# Patient Record
Sex: FEMALE | Race: BLACK OR AFRICAN AMERICAN
[De-identification: names, ages, dates, MRNs, and addresses within clinical notes are randomized per-mention and may not be internally consistent; named-entity substitution may affect disease eponyms.]

---

## 2017-01-04 ENCOUNTER — APPOINTMENT (OUTPATIENT)
Dept: INTERNAL MEDICINE | Facility: CLINIC | Age: 42
End: 2017-01-04

## 2017-06-02 ENCOUNTER — APPOINTMENT (OUTPATIENT)
Dept: ENDOCRINOLOGY | Facility: CLINIC | Age: 42
End: 2017-06-02

## 2018-02-13 ENCOUNTER — OUTPATIENT (OUTPATIENT)
Dept: OUTPATIENT SERVICES | Facility: HOSPITAL | Age: 43
LOS: 1 days | Discharge: HOME | End: 2018-02-13

## 2018-02-13 DIAGNOSIS — N64.4 MASTODYNIA: ICD-10-CM

## 2018-03-17 ENCOUNTER — OUTPATIENT (OUTPATIENT)
Dept: OUTPATIENT SERVICES | Facility: HOSPITAL | Age: 43
LOS: 1 days | Discharge: HOME | End: 2018-03-17

## 2018-03-17 DIAGNOSIS — R79.89 OTHER SPECIFIED ABNORMAL FINDINGS OF BLOOD CHEMISTRY: ICD-10-CM

## 2018-03-17 DIAGNOSIS — M06.9 RHEUMATOID ARTHRITIS, UNSPECIFIED: ICD-10-CM

## 2018-03-17 DIAGNOSIS — E55.9 VITAMIN D DEFICIENCY, UNSPECIFIED: ICD-10-CM

## 2018-03-17 DIAGNOSIS — A69.20 LYME DISEASE, UNSPECIFIED: ICD-10-CM

## 2018-03-17 DIAGNOSIS — D64.9 ANEMIA, UNSPECIFIED: ICD-10-CM

## 2018-09-21 ENCOUNTER — APPOINTMENT (OUTPATIENT)
Dept: ENDOCRINOLOGY | Facility: CLINIC | Age: 43
End: 2018-09-21

## 2019-09-18 ENCOUNTER — EMERGENCY (EMERGENCY)
Facility: HOSPITAL | Age: 44
LOS: 0 days | Discharge: LEFT BEFORE DISCHARGE | End: 2019-09-18
Attending: EMERGENCY MEDICINE | Admitting: EMERGENCY MEDICINE
Payer: MEDICAID

## 2019-09-18 VITALS
OXYGEN SATURATION: 100 % | RESPIRATION RATE: 20 BRPM | HEART RATE: 80 BPM | SYSTOLIC BLOOD PRESSURE: 129 MMHG | TEMPERATURE: 98 F | WEIGHT: 119.93 LBS | DIASTOLIC BLOOD PRESSURE: 61 MMHG

## 2019-09-18 VITALS
TEMPERATURE: 98 F | RESPIRATION RATE: 18 BRPM | OXYGEN SATURATION: 99 % | HEART RATE: 72 BPM | DIASTOLIC BLOOD PRESSURE: 62 MMHG | SYSTOLIC BLOOD PRESSURE: 120 MMHG

## 2019-09-18 DIAGNOSIS — J32.9 CHRONIC SINUSITIS, UNSPECIFIED: ICD-10-CM

## 2019-09-18 DIAGNOSIS — R07.9 CHEST PAIN, UNSPECIFIED: ICD-10-CM

## 2019-09-18 LAB
ALBUMIN SERPL ELPH-MCNC: 4.4 G/DL — SIGNIFICANT CHANGE UP (ref 3.5–5.2)
ALP SERPL-CCNC: 52 U/L — SIGNIFICANT CHANGE UP (ref 30–115)
ALT FLD-CCNC: 11 U/L — SIGNIFICANT CHANGE UP (ref 0–41)
ANION GAP SERPL CALC-SCNC: 6 MMOL/L — LOW (ref 7–14)
AST SERPL-CCNC: 38 U/L — SIGNIFICANT CHANGE UP (ref 0–41)
BASOPHILS # BLD AUTO: 0.03 K/UL — SIGNIFICANT CHANGE UP (ref 0–0.2)
BASOPHILS NFR BLD AUTO: 0.5 % — SIGNIFICANT CHANGE UP (ref 0–1)
BILIRUB SERPL-MCNC: 0.2 MG/DL — SIGNIFICANT CHANGE UP (ref 0.2–1.2)
BUN SERPL-MCNC: 11 MG/DL — SIGNIFICANT CHANGE UP (ref 10–20)
CALCIUM SERPL-MCNC: 8.8 MG/DL — SIGNIFICANT CHANGE UP (ref 8.5–10.1)
CHLORIDE SERPL-SCNC: 105 MMOL/L — SIGNIFICANT CHANGE UP (ref 98–110)
CO2 SERPL-SCNC: 23 MMOL/L — SIGNIFICANT CHANGE UP (ref 17–32)
CREAT SERPL-MCNC: 0.8 MG/DL — SIGNIFICANT CHANGE UP (ref 0.7–1.5)
EOSINOPHIL # BLD AUTO: 0.11 K/UL — SIGNIFICANT CHANGE UP (ref 0–0.7)
EOSINOPHIL NFR BLD AUTO: 1.9 % — SIGNIFICANT CHANGE UP (ref 0–8)
GAS PNL BLDV: SIGNIFICANT CHANGE UP
GLUCOSE SERPL-MCNC: 114 MG/DL — HIGH (ref 70–99)
HCT VFR BLD CALC: 37.2 % — SIGNIFICANT CHANGE UP (ref 37–47)
HGB BLD-MCNC: 12.4 G/DL — SIGNIFICANT CHANGE UP (ref 12–16)
IMM GRANULOCYTES NFR BLD AUTO: 0 % — LOW (ref 0.1–0.3)
LYMPHOCYTES # BLD AUTO: 2.35 K/UL — SIGNIFICANT CHANGE UP (ref 1.2–3.4)
LYMPHOCYTES # BLD AUTO: 40.7 % — SIGNIFICANT CHANGE UP (ref 20.5–51.1)
MCHC RBC-ENTMCNC: 33.3 G/DL — SIGNIFICANT CHANGE UP (ref 32–37)
MCHC RBC-ENTMCNC: 33.6 PG — HIGH (ref 27–31)
MCV RBC AUTO: 100.8 FL — HIGH (ref 81–99)
MONOCYTES # BLD AUTO: 0.47 K/UL — SIGNIFICANT CHANGE UP (ref 0.1–0.6)
MONOCYTES NFR BLD AUTO: 8.1 % — SIGNIFICANT CHANGE UP (ref 1.7–9.3)
NEUTROPHILS # BLD AUTO: 2.81 K/UL — SIGNIFICANT CHANGE UP (ref 1.4–6.5)
NEUTROPHILS NFR BLD AUTO: 48.8 % — SIGNIFICANT CHANGE UP (ref 42.2–75.2)
NRBC # BLD: 0 /100 WBCS — SIGNIFICANT CHANGE UP (ref 0–0)
PLATELET # BLD AUTO: 227 K/UL — SIGNIFICANT CHANGE UP (ref 130–400)
POTASSIUM SERPL-MCNC: SIGNIFICANT CHANGE UP MMOL/L (ref 3.5–5)
POTASSIUM SERPL-SCNC: SIGNIFICANT CHANGE UP MMOL/L (ref 3.5–5)
PROT SERPL-MCNC: 8.1 G/DL — HIGH (ref 6–8)
RBC # BLD: 3.69 M/UL — LOW (ref 4.2–5.4)
RBC # FLD: 14.2 % — SIGNIFICANT CHANGE UP (ref 11.5–14.5)
SODIUM SERPL-SCNC: 134 MMOL/L — LOW (ref 135–146)
WBC # BLD: 5.77 K/UL — SIGNIFICANT CHANGE UP (ref 4.8–10.8)
WBC # FLD AUTO: 5.77 K/UL — SIGNIFICANT CHANGE UP (ref 4.8–10.8)

## 2019-09-18 PROCEDURE — 71046 X-RAY EXAM CHEST 2 VIEWS: CPT | Mod: 26

## 2019-09-18 PROCEDURE — 93010 ELECTROCARDIOGRAM REPORT: CPT

## 2019-09-18 PROCEDURE — 99284 EMERGENCY DEPT VISIT MOD MDM: CPT

## 2019-09-18 RX ORDER — KETOROLAC TROMETHAMINE 30 MG/ML
15 SYRINGE (ML) INJECTION ONCE
Refills: 0 | Status: DISCONTINUED | OUTPATIENT
Start: 2019-09-18 | End: 2019-09-18

## 2019-09-18 RX ADMIN — Medication 15 MILLIGRAM(S): at 10:06

## 2019-09-18 NOTE — ED PROVIDER NOTE - CLINICAL SUMMARY MEDICAL DECISION MAKING FREE TEXT BOX
Labs reassuring aside from unresulted potassium.  Likely viral sinusitis.  Repeat K normal.  D/C home.  Supportive care.

## 2019-09-18 NOTE — ED PROVIDER NOTE - PROGRESS NOTE DETAILS
pt vbg needs to be done due to K resulting as "TNP". pt does not want to wait for results and requesting to go home to rest in her bed. I had extensive discussion of risks and benefits of pursuing further medical evaluation and/or care with patient and any available family/friends; patient still electing to leave against medical advice. Patient is awake, alert, oriented and demonstrates full capacity and insight into illness. Patient aware and encouraged to return immediately to ED or nearest ED if patient decides to change mind regarding care or if patient experiences any new, worsening, or concerning symptoms.

## 2019-09-18 NOTE — ED PROVIDER NOTE - ATTENDING CONTRIBUTION TO CARE
45 y/o female with hx of sinusitis presents to ED with congestion, myalgia and cough x 3 days, (+) ear fullness.  No fever, HA, neck pain, abdominal pain, vaginal discharge, dysuria/frequency.  PE:  agree with above  A/P:  Viral Syndrome.  Check labs and reassess.

## 2019-09-18 NOTE — ED PROVIDER NOTE - PATIENT PORTAL LINK FT
You can access the FollowMyHealth Patient Portal offered by [facility] by registering at the following website: [email or web address]. By joining [employer]’s FollowMyHealth portal, you will also be able to view your health information using other applications (apps) compatible with our system.

## 2019-09-18 NOTE — ED ADULT NURSE NOTE - OBJECTIVE STATEMENT
Pt brought in by friend. Pt c/o of generalized pain throughout body, cough, no sputum. Pt states no trauma to body. Took tylenol for pain, no relief.

## 2019-09-18 NOTE — ED PROVIDER NOTE - NSFOLLOWUPINSTRUCTIONS_ED_ALL_ED_FT
Please follow up with your primary care physician within 24-72 hours and return immediately if symptoms worsen.    Sinusitis, Adult  Sinusitis is soreness and swelling (inflammation) of your sinuses. Sinuses are hollow spaces in the bones around your face. They are located:    Around your eyes.  In the middle of your forehead.  Behind your nose.  In your cheekbones.    Your sinuses and nasal passages are lined with a stringy fluid (mucus). Mucus normally drains out of your sinuses. Swelling can trap mucus in your sinuses. This lets germs like bacteria or viruses grow, and that leads to infection. Most of the time, sinusitis is caused by a virus.    ImageIf bacteria is causing your infection, your doctor may have you wait and see if you get better without antibiotic medicine. You may be prescribed antibiotics if you have:    A very bad infection.  A weak disease-fighting (immune) system.    Follow these instructions at home:  Medicines     Take, use, or apply over-the-counter and prescription medicines only as told by your doctor. These may include nasal sprays.  If you were prescribed an antibiotic, take it as told by your doctor. Do not stop taking the antibiotic even if you start to feel better.  Hydrate and Humidify     Drink enough water to keep your pee (urine) pale yellow.  Use a cool mist humidifier to keep the humidity level in your home above 50%.  Breathe in steam for 10–15 minutes, 3–4 times a day or as told by your doctor. You can do this in the bathroom while a hot shower is running.  Try not to spend time in cool or dry air.  Rest     Rest as much as possible.  Sleep with your head raised (elevated).  Make sure to get enough sleep each night.  General instructions     Put a warm, moist washcloth on your face 3–4 times a day, or as often as told by your doctor. This will help with discomfort.  Wash your hands often with soap and water. If there is no soap and water, use hand .  Do not smoke. Avoid being around people who are smoking (secondhand smoke).  Keep all follow-up visits as told by your doctor. This is important.  Contact a doctor if:  You have a fever.  Your symptoms get worse.  Your symptoms do not get better within 10 days.  Get help right away if:  You have a very bad headache.  You cannot stop throwing up (vomiting).  You have pain or swelling around your face or eyes.  You have trouble seeing.  You feel confused.  Your neck is stiff.  You have trouble breathing.  This information is not intended to replace advice given to you by your health care provider. Make sure you discuss any questions you have with your health care provider.

## 2019-09-18 NOTE — ED PROVIDER NOTE - PHYSICAL EXAMINATION
Gen: NAD  Head: NCAT  HEENT: PERRL, oral mucosa moist, normal conjunctiva, oropharynx clear without exudate or erythema  Lung: CTAB, no respiratory distress, no wheezing, rales, rhonchi  CV: normal s1/s2, rrr, Normal perfusion, pulses 2+ throughout  Abd: soft, NTND, no CVA tenderness  Genitourinary: no pelvic tenderness  MSK: No edema, no visible deformities, full range of motion in all 4 extremities  Neuro: AOx3  Skin: No rash   Psych: normal affect

## 2019-09-18 NOTE — ED PROVIDER NOTE - NS ED ROS FT
Constitutional: (-) fever  Eyes/ENT: (-) blurry vision, (-) epistaxis, (-) visual changes   Cardiovascular: (-) chest pain, (-) syncope  Respiratory: (+) cough, (-) shortness of breath  Gastrointestinal: (-) vomiting, (-) diarrhea  Genitourinary: (-) dysuria, (-) hesitancy, (-) frequency   Musculoskeletal: (-) neck pain, (-) back pain, (-) joint pain, body aches   Integumentary: (-) rash, (-) edema  Neurological: (-) headache, (-) altered mental status  Allergic/Immunologic: (-) pruritus

## 2019-09-18 NOTE — ED PROVIDER NOTE - OBJECTIVE STATEMENT
45 yo female with no known pmh presents with body aches and congestion. pt states this has been presents for 3 days with a non-productive cough. she had also experience chill and ear fullness. she follows with ENT and had eustachian tubes placed one year prior but had not seen ENT for about a year. she denies any other symptoms including fevers, headache, recent illness/travel, abdominal pain, chest pain, or SOB.

## 2019-09-18 NOTE — ED ADULT TRIAGE NOTE - CHIEF COMPLAINT QUOTE
patient reports cough congestion body aches x few days with midsternal chest pain radiating to left chest . lung sounds clear but diminished to left base. denies fever no sick contacts

## 2019-09-18 NOTE — ED PROVIDER NOTE - NSFOLLOWUPCLINICS_GEN_ALL_ED_FT
[facility]  ENT  [address], [address]nd [place] [address]  Phone: [phone]  Fax:   Follow Up Time: 1-3 Days

## 2019-12-04 ENCOUNTER — EMERGENCY (EMERGENCY)
Facility: HOSPITAL | Age: 44
LOS: 0 days | Discharge: HOME | End: 2019-12-04
Admitting: EMERGENCY MEDICINE
Payer: MEDICAID

## 2019-12-04 VITALS
TEMPERATURE: 99 F | DIASTOLIC BLOOD PRESSURE: 73 MMHG | RESPIRATION RATE: 20 BRPM | OXYGEN SATURATION: 100 % | SYSTOLIC BLOOD PRESSURE: 133 MMHG | HEART RATE: 98 BPM

## 2019-12-04 DIAGNOSIS — M53.3 SACROCOCCYGEAL DISORDERS, NOT ELSEWHERE CLASSIFIED: ICD-10-CM
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
## 2019-12-04 DIAGNOSIS — F17.210 NICOTINE DEPENDENCE, CIGARETTES, UNCOMPLICATED: ICD-10-CM
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
## 2019-12-04 DIAGNOSIS — J02.9 ACUTE PHARYNGITIS, UNSPECIFIED: ICD-10-CM

## 2019-12-04 DIAGNOSIS — M79.651 PAIN IN RIGHT THIGH: ICD-10-CM

## 2019-12-04 PROCEDURE — 99283 EMERGENCY DEPT VISIT LOW MDM: CPT

## 2019-12-04 RX ORDER — KETOROLAC TROMETHAMINE 30 MG/ML
30 SYRINGE (ML) INJECTION ONCE
Refills: 0 | Status: DISCONTINUED | OUTPATIENT
Start: 2019-12-04 | End: 2019-12-04

## 2019-12-04 RX ORDER — ACETAMINOPHEN 500 MG
975 TABLET ORAL ONCE
Refills: 0 | Status: COMPLETED | OUTPATIENT
Start: 2019-12-04 | End: 2019-12-04

## 2019-12-04 RX ORDER — AZITHROMYCIN 500 MG/1
1 TABLET, FILM COATED ORAL
Qty: 5 | Refills: 0
Start: 2019-12-04 | End: 2019-12-08

## 2019-12-04 RX ADMIN — Medication 30 MILLIGRAM(S): at 10:24

## 2019-12-04 RX ADMIN — Medication 975 MILLIGRAM(S): at 10:23

## 2019-12-04 NOTE — ED PROVIDER NOTE - PATIENT PORTAL LINK FT
You can access the FollowMyHealth Patient Portal offered by [facility] by registering at the following website: [email or web address]. By joining [employer]’s FollowMyHealth portal, you will also be able to view your health information using other applications (apps) compatible with our system.

## 2019-12-04 NOTE — ED PROVIDER NOTE - CLINICAL SUMMARY MEDICAL DECISION MAKING FREE TEXT BOX
43yo female, active cigarette smoker with flu like illness. Discussed risks and benefits of tamiflu. Patient declines. Will prescribe zpak for productive cough in a smoker. Provided NSAIDs and tylenol. f/u med clinic

## 2019-12-04 NOTE — ED PROVIDER NOTE - CARE PLAN
Principal Discharge DX:	Flu-like symptoms  Secondary Diagnosis:	Leg pain  Secondary Diagnosis:	Cough  Secondary Diagnosis:	Fall

## 2019-12-04 NOTE — ED PROVIDER NOTE - PHYSICAL EXAMINATION
CONST: Uncomfortable, fatigued appearing, lying in fetal position on stretcher with 2 blankets.  EYES: PERRL, EOMI, Sclera and conjunctiva clear.   ENT: No nasal discharge. TM's clear B/L without drainage. Oropharynx normal appearing, no erythema or exudates.   NECK: Non-tender, no meningeal signs  CARD: Normal S1 S2; Normal rate and rhythm  RESP: Equal BS B/L, No wheezes, rhonchi or rales. No distress  GI: Soft, non-tender, non-distended.  MS: Normal ROM in all extremities. No midline spinal tenderness. Mild tenderness R gluteus and lateral thigh. No bony tenderness. Neg SLR  SKIN: Warm, dry, no acute rashes. Good turgor  NEURO: A&Ox3, No focal deficits. Strength 5/5 with no sensory deficits.

## 2019-12-04 NOTE — ED PROVIDER NOTE - NS ED ROS FT
CONST: No fever, chills or bodyaches  EYES: No pain, redness, drainage or visual changes.  ENT: (+) sore throat  CARD: No chest pain, palpitations  RESP: cough productive of green sputum  GI: No abdominal pain  MS: see HPI  SKIN: No rashes  NEURO: No paresthesias

## 2019-12-04 NOTE — ED PROVIDER NOTE - OBJECTIVE STATEMENT
45yo female with no significant PMHx presents c/o fever, chills, bodyaches, cough, sore throat since last night, worsening this AM and also reports a fall yesterday while at work, slipping on ice, and falling onto R buttock and leg. Patient states upon awakening, she had a subjective fever, diffuse bodyaches, noting "skin and eyes even hurt," and cough productive of green sputum. No relieving or aggravating factors. (+) sick contacts at work and did not receive flu shot.

## 2019-12-04 NOTE — ED ADULT TRIAGE NOTE - CHIEF COMPLAINT QUOTE
Pt fell outside of work yesterday and hurt entire right side of body. Pt also c/o generalized body aches and sore throat, deneis head injury

## 2019-12-04 NOTE — ED PROVIDER NOTE - NSFOLLOWUPCLINICS_GEN_ALL_ED_FT
[facility]  Medicine  [address], [place]   Phone: [phone]  Fax:   Follow Up Time:

## 2019-12-04 NOTE — ED ADULT NURSE NOTE - CHIEF COMPLAINT QUOTE
Pt fell outside of work yesterday and hurt entire right side of body. Pt also c/o generalized body aches and sore throat, denies head injury

## 2019-12-04 NOTE — ED PROVIDER NOTE - NSFOLLOWUPINSTRUCTIONS_ED_ALL_ED_FT
Viral Illness, Adult  Viruses are tiny germs that can get into a person's body and cause illness. There are many different types of viruses, and they cause many types of illness. Viral illnesses can range from mild to severe. They can affect various parts of the body.    What are the causes?  Many types of viruses can cause illness. Viruses invade cells in your body, multiply, and cause the infected cells to malfunction or die. When the cell dies, it releases more of the virus. When this happens, you develop symptoms of the illness, and the virus continues to spread to other cells. If the virus takes over the function of the cell, it can cause the cell to divide and grow out of control, as is the case when a virus causes cancer.  Different viruses get into the body in different ways. You can get a virus by:  Swallowing food or water that is contaminated with the virus.Breathing in droplets that have been coughed or sneezed into the air by an infected person.Touching a surface that has been contaminated with the virus and then touching your eyes, nose, or mouth.Being bitten by an insect or animal that carries the virus.Having sexual contact with a person who is infected with the virus.Being exposed to blood or fluids that contain the virus, either through an open cut or during a transfusion.If a virus enters your body, your body's defense system (immune system) will try to fight the virus. You may be at higher risk for a viral illness if your immune system is weak.  What are the signs or symptoms?  Symptoms vary depending on the type of virus and the location of the cells that it invades. Common symptoms of the main types of viral illnesses include:  Cold and flu viruses     Fever.Headache.Sore throat.Muscle aches.Nasal congestion.Cough.Digestive system (gastrointestinal) viruses     Fever.Abdominal pain.Nausea.Diarrhea.Liver viruses (hepatitis)     Loss of appetite.Tiredness.Yellowing of the skin (jaundice).Brain and spinal cord viruses     Fever.Headache.Stiff neck.Nausea and vomiting.Confusion or sleepiness.Skin viruses     Warts.Itching.Rash.Sexually transmitted viruses     Discharge.Swelling.Redness.Rash.How is this treated?  Viruses can be difficult to treat because they live within cells. Antibiotic medicines do not treat viruses because these drugs do not get inside cells. Treatment for a viral illness may include:  Resting and drinking plenty of fluids.Medicines to relieve symptoms. These can include over-the-counter medicine for pain and fever, medicines for cough or congestion, and medicines to relieve diarrhea.Antiviral medicines. These drugs are available only for certain types of viruses. They may help reduce flu symptoms if taken early. There are also many antiviral medicines for hepatitis and HIV/AIDS.Some viral illnesses can be prevented with vaccinations. A common example is the flu shot.  Follow these instructions at home:  Medicines        Take over-the-counter and prescription medicines only as told by your health care provider.If you were prescribed an antiviral medicine, take it as told by your health care provider. Do not stop taking the medicine even if you start to feel better.Be aware of when antibiotics are needed and when they are not needed. Antibiotics do not treat viruses. If your health care provider thinks that you may have a bacterial infection as well as a viral infection, you may get an antibiotic.  Do not ask for an antibiotic prescription if you have been diagnosed with a viral illness. That will not make your illness go away faster.Frequently taking antibiotics when they are not needed can lead to antibiotic resistance. When this develops, the medicine no longer works against the bacteria that it normally fights.General instructions     Drink enough fluids to keep your urine clear or pale yellow.Rest as much as possible.Return to your normal activities as told by your health care provider. Ask your health care provider what activities are safe for you.Keep all follow-up visits as told by your health care provider. This is important.How is this prevented?  Take these actions to reduce your risk of viral infection:  Eat a healthy diet and get enough rest.Wash your hands often with soap and water. This is especially important when you are in public places. If soap and water are not available, use hand .Avoid close contact with friends and family who have a viral illness.If you travel to areas where viral gastrointestinal infection is common, avoid drinking water or eating raw food.Keep your immunizations up to date. Get a flu shot every year as told by your health care provider.Do not share toothbrushes, nail clippers, razors, or needles with other people.Always practice safe sex.Contact a health care provider if:  You have symptoms of a viral illness that do not go away.Your symptoms come back after going away.Your symptoms get worse.Get help right away if:  You have trouble breathing.You have a severe headache or a stiff neck.You have severe vomiting or abdominal pain.This information is not intended to replace advice given to you by your health care provider. Make sure you discuss any questions you have with your health care provider.

## 2020-08-31 ENCOUNTER — OUTPATIENT (OUTPATIENT)
Dept: OUTPATIENT SERVICES | Facility: HOSPITAL | Age: 45
LOS: 1 days | Discharge: HOME | End: 2020-08-31

## 2020-08-31 PROBLEM — Z78.9 OTHER SPECIFIED HEALTH STATUS: Chronic | Status: ACTIVE | Noted: 2019-12-04

## 2021-08-07 ENCOUNTER — EMERGENCY (EMERGENCY)
Facility: HOSPITAL | Age: 46
LOS: 0 days | Discharge: LEFT BEFORE DISCHARGE | End: 2021-08-08
Attending: EMERGENCY MEDICINE | Admitting: EMERGENCY MEDICINE
Payer: MEDICAID

## 2021-08-07 VITALS
WEIGHT: 149.91 LBS | RESPIRATION RATE: 18 BRPM | HEART RATE: 94 BPM | OXYGEN SATURATION: 100 % | TEMPERATURE: 97 F | SYSTOLIC BLOOD PRESSURE: 155 MMHG | DIASTOLIC BLOOD PRESSURE: 63 MMHG

## 2021-08-07 DIAGNOSIS — F17.200 NICOTINE DEPENDENCE, UNSPECIFIED, UNCOMPLICATED: ICD-10-CM

## 2021-08-07 DIAGNOSIS — F41.9 ANXIETY DISORDER, UNSPECIFIED: ICD-10-CM

## 2021-08-07 DIAGNOSIS — R10.84 GENERALIZED ABDOMINAL PAIN: ICD-10-CM

## 2021-08-07 DIAGNOSIS — R11.2 NAUSEA WITH VOMITING, UNSPECIFIED: ICD-10-CM

## 2021-08-07 DIAGNOSIS — F32.9 MAJOR DEPRESSIVE DISORDER, SINGLE EPISODE, UNSPECIFIED: ICD-10-CM

## 2021-08-07 DIAGNOSIS — Z87.19 PERSONAL HISTORY OF OTHER DISEASES OF THE DIGESTIVE SYSTEM: ICD-10-CM

## 2021-08-07 LAB
ALBUMIN SERPL ELPH-MCNC: 4.3 G/DL — SIGNIFICANT CHANGE UP (ref 3.5–5.2)
ALP SERPL-CCNC: 89 U/L — SIGNIFICANT CHANGE UP (ref 30–115)
ALT FLD-CCNC: 45 U/L — HIGH (ref 0–41)
ANION GAP SERPL CALC-SCNC: 11 MMOL/L — SIGNIFICANT CHANGE UP (ref 7–14)
AST SERPL-CCNC: 164 U/L — HIGH (ref 0–41)
BASOPHILS # BLD AUTO: 0.03 K/UL — SIGNIFICANT CHANGE UP (ref 0–0.2)
BASOPHILS NFR BLD AUTO: 0.4 % — SIGNIFICANT CHANGE UP (ref 0–1)
BILIRUB DIRECT SERPL-MCNC: <0.2 MG/DL — SIGNIFICANT CHANGE UP (ref 0–0.2)
BILIRUB INDIRECT FLD-MCNC: >0.1 MG/DL — LOW (ref 0.2–1.2)
BILIRUB SERPL-MCNC: 0.3 MG/DL — SIGNIFICANT CHANGE UP (ref 0.2–1.2)
BUN SERPL-MCNC: 14 MG/DL — SIGNIFICANT CHANGE UP (ref 10–20)
CALCIUM SERPL-MCNC: 9 MG/DL — SIGNIFICANT CHANGE UP (ref 8.5–10.1)
CHLORIDE SERPL-SCNC: 102 MMOL/L — SIGNIFICANT CHANGE UP (ref 98–110)
CO2 SERPL-SCNC: 23 MMOL/L — SIGNIFICANT CHANGE UP (ref 17–32)
CREAT SERPL-MCNC: 1 MG/DL — SIGNIFICANT CHANGE UP (ref 0.7–1.5)
EOSINOPHIL # BLD AUTO: 0.11 K/UL — SIGNIFICANT CHANGE UP (ref 0–0.7)
EOSINOPHIL NFR BLD AUTO: 1.4 % — SIGNIFICANT CHANGE UP (ref 0–8)
GLUCOSE SERPL-MCNC: 102 MG/DL — HIGH (ref 70–99)
HCT VFR BLD CALC: 33 % — LOW (ref 37–47)
HGB BLD-MCNC: 11.1 G/DL — LOW (ref 12–16)
IMM GRANULOCYTES NFR BLD AUTO: 0.2 % — SIGNIFICANT CHANGE UP (ref 0.1–0.3)
LACTATE SERPL-SCNC: 1.3 MMOL/L — SIGNIFICANT CHANGE UP (ref 0.7–2)
LIDOCAIN IGE QN: 26 U/L — SIGNIFICANT CHANGE UP (ref 7–60)
LYMPHOCYTES # BLD AUTO: 3.49 K/UL — HIGH (ref 1.2–3.4)
LYMPHOCYTES # BLD AUTO: 43.6 % — SIGNIFICANT CHANGE UP (ref 20.5–51.1)
MCHC RBC-ENTMCNC: 33.3 PG — HIGH (ref 27–31)
MCHC RBC-ENTMCNC: 33.6 G/DL — SIGNIFICANT CHANGE UP (ref 32–37)
MCV RBC AUTO: 99.1 FL — HIGH (ref 81–99)
MONOCYTES # BLD AUTO: 0.51 K/UL — SIGNIFICANT CHANGE UP (ref 0.1–0.6)
MONOCYTES NFR BLD AUTO: 6.4 % — SIGNIFICANT CHANGE UP (ref 1.7–9.3)
NEUTROPHILS # BLD AUTO: 3.85 K/UL — SIGNIFICANT CHANGE UP (ref 1.4–6.5)
NEUTROPHILS NFR BLD AUTO: 48 % — SIGNIFICANT CHANGE UP (ref 42.2–75.2)
NRBC # BLD: 0 /100 WBCS — SIGNIFICANT CHANGE UP (ref 0–0)
PLATELET # BLD AUTO: 206 K/UL — SIGNIFICANT CHANGE UP (ref 130–400)
POTASSIUM SERPL-MCNC: 5.9 MMOL/L — HIGH (ref 3.5–5)
POTASSIUM SERPL-SCNC: 5.9 MMOL/L — HIGH (ref 3.5–5)
PROT SERPL-MCNC: 7.9 G/DL — SIGNIFICANT CHANGE UP (ref 6–8)
RBC # BLD: 3.33 M/UL — LOW (ref 4.2–5.4)
RBC # FLD: 14 % — SIGNIFICANT CHANGE UP (ref 11.5–14.5)
SODIUM SERPL-SCNC: 136 MMOL/L — SIGNIFICANT CHANGE UP (ref 135–146)
WBC # BLD: 8.01 K/UL — SIGNIFICANT CHANGE UP (ref 4.8–10.8)
WBC # FLD AUTO: 8.01 K/UL — SIGNIFICANT CHANGE UP (ref 4.8–10.8)

## 2021-08-07 PROCEDURE — 99285 EMERGENCY DEPT VISIT HI MDM: CPT

## 2021-08-07 PROCEDURE — 93010 ELECTROCARDIOGRAM REPORT: CPT

## 2021-08-07 RX ORDER — ONDANSETRON 8 MG/1
4 TABLET, FILM COATED ORAL ONCE
Refills: 0 | Status: COMPLETED | OUTPATIENT
Start: 2021-08-07 | End: 2021-08-07

## 2021-08-07 RX ORDER — SODIUM CHLORIDE 9 MG/ML
1000 INJECTION, SOLUTION INTRAVENOUS ONCE
Refills: 0 | Status: COMPLETED | OUTPATIENT
Start: 2021-08-07 | End: 2021-08-07

## 2021-08-07 RX ORDER — MORPHINE SULFATE 50 MG/1
4 CAPSULE, EXTENDED RELEASE ORAL ONCE
Refills: 0 | Status: DISCONTINUED | OUTPATIENT
Start: 2021-08-07 | End: 2021-08-07

## 2021-08-07 RX ADMIN — SODIUM CHLORIDE 1000 MILLILITER(S): 9 INJECTION, SOLUTION INTRAVENOUS at 22:44

## 2021-08-07 RX ADMIN — MORPHINE SULFATE 4 MILLIGRAM(S): 50 CAPSULE, EXTENDED RELEASE ORAL at 22:44

## 2021-08-07 RX ADMIN — ONDANSETRON 4 MILLIGRAM(S): 8 TABLET, FILM COATED ORAL at 22:44

## 2021-08-08 LAB — HCG SERPL QL: NEGATIVE — SIGNIFICANT CHANGE UP

## 2021-08-08 NOTE — ED PROVIDER NOTE - CLINICAL SUMMARY MEDICAL DECISION MAKING FREE TEXT BOX
Patient remained stable in ED, improved well. Patient refused any further medical care in ED, refused admission, refused any further diagnostic studies or treatments. Discussed with patient in detail about clinical condition, and the need for further medical evaluation/treatments, patient verbalized understanding and still refused. Discussed with patient about the risks of leaving AMA, Patient verbalized understanding the information provided and still wanted to leave AMA. Patient is awake, alert, o x 3, coherent, and is capacitated to make decisions. Discussed with patient in detail about clinical condition, results of the diagnostic studies, was told to come back to ED if decide to continue with medical evaluation/treatments, and was discussed about  the need for close out patient follow up. Detail aftercare instructions and return precautions are given.

## 2021-08-08 NOTE — ED PROVIDER NOTE - OBJECTIVE STATEMENT
44 y/o F PMHx anxiety and depression s/p маринаey and appy in past presents to ED with diffuse abdominal pain acutely starting tonight after eating [employer] for dinner. Pt reports nausea and nbnb vomiting. No fevers. No chest pain. No diarrhea or bloody stools. Adnominal pain is diffuse, moderate and non radiating. Denies vaginal bleeding, discharge or dysuria.

## 2021-08-08 NOTE — ED PROVIDER NOTE - PHYSICAL EXAMINATION
CONSTITUTIONAL: Well-developed; well-nourished; in no acute distress.   SKIN: warm, dry  HEAD: Normocephalic; atraumatic.  EYES: no conjunctival injection. EOMI.   ENT: No nasal discharge; airway clear.  NECK: Supple; non tender.  CARD: S1, S2 normal;  Regular rate and rhythm.   RESP: No wheezes, rales or rhonchi.  ABD: soft nondistended, +diffuse TTP, no CVA TTP   EXT: Normal ROM.  No clubbing, cyanosis or edema.   LYMPH: No acute cervical adenopathy.  NEURO: Alert, oriented, grossly unremarkable.  PSYCH: Cooperative, appropriate.

## 2021-08-08 NOTE — ED PROVIDER NOTE - PATIENT PORTAL LINK FT
You can access the FollowMyHealth Patient Portal offered by [facility] by registering at the following website: [email or web address]. By joining [employer]’s FollowMyHealth portal, you will also be able to view your health information using other applications (apps) compatible with our system.

## 2021-08-08 NOTE — ED PROVIDER NOTE - CARE PROVIDER_API CALL
Dana Russo (MD)  Gastroenterology  [address] [place] [address]  Phone: [phone]  Fax: [phone]  Follow Up Time: 1-3 Days

## 2021-08-08 NOTE — ED PROVIDER NOTE - NS ED ROS FT
Review of Systems:  CONSTITUTIONAL: No fever, No diaphoresis   SKIN: No rash  HEMATOLOGIC: No abnormal bleeding or bruising  EYES: No eye pain, No blurred vision  ENT:  No sore throat, No neck pain, No rhinorrhea   RESPIRATORY: No shortness of breath, No cough  CARDIAC: No chest pain, No palpitations  GI: +abdominal pain, +nausea, +vomiting, No diarrhea, No constipation, No bright red blood per rectum or melena. No flank pain  : No dysuria, frequency, hematuria.   MUSCULOSKELETAL: No joint paint, No swelling, No back pain  NEUROLOGIC: No numbness, No focal weakness, No headache, No dizziness  All other systems negative, unless specified in HPI

## 2021-08-08 NOTE — ED PROVIDER NOTE - ATTENDING CONTRIBUTION TO CARE
Patient is c/o abd pain, generalized abd pain, with n/v, denies cp/sob/syncope/trauma.   Vitals reviewed.   Lungs: CTA,   abd: +BS, +generalized tenderness, ND, soft,   A/P: Abd pain,   labs, imaging,   reevaluation.

## 2021-12-15 ENCOUNTER — EMERGENCY (EMERGENCY)
Facility: HOSPITAL | Age: 46
LOS: 0 days | Discharge: HOME | End: 2021-12-15
Attending: EMERGENCY MEDICINE | Admitting: EMERGENCY MEDICINE
Payer: MEDICAID

## 2021-12-15 VITALS
WEIGHT: 119.93 LBS | SYSTOLIC BLOOD PRESSURE: 135 MMHG | HEART RATE: 101 BPM | DIASTOLIC BLOOD PRESSURE: 80 MMHG | RESPIRATION RATE: 17 BRPM | OXYGEN SATURATION: 100 % | TEMPERATURE: 98 F

## 2021-12-15 DIAGNOSIS — F17.200 NICOTINE DEPENDENCE, UNSPECIFIED, UNCOMPLICATED: ICD-10-CM

## 2021-12-15 DIAGNOSIS — J06.9 ACUTE UPPER RESPIRATORY INFECTION, UNSPECIFIED: ICD-10-CM

## 2021-12-15 DIAGNOSIS — R05.9 COUGH, UNSPECIFIED: ICD-10-CM

## 2021-12-15 DIAGNOSIS — R09.81 NASAL CONGESTION: ICD-10-CM

## 2021-12-15 DIAGNOSIS — Z20.822 CONTACT WITH AND (SUSPECTED) EXPOSURE TO COVID-19: ICD-10-CM

## 2021-12-15 LAB
RAPID RVP RESULT: DETECTED
RV+EV RNA SPEC QL NAA+PROBE: DETECTED
SARS-COV-2 RNA SPEC QL NAA+PROBE: SIGNIFICANT CHANGE UP

## 2021-12-15 PROCEDURE — 71045 X-RAY EXAM CHEST 1 VIEW: CPT | Mod: 26

## 2021-12-15 PROCEDURE — 99284 EMERGENCY DEPT VISIT MOD MDM: CPT

## 2021-12-15 RX ORDER — KETOROLAC TROMETHAMINE 30 MG/ML
30 SYRINGE (ML) INJECTION ONCE
Refills: 0 | Status: DISCONTINUED | OUTPATIENT
Start: 2021-12-15 | End: 2021-12-15

## 2021-12-15 RX ADMIN — Medication 30 MILLIGRAM(S): at 12:20

## 2021-12-15 NOTE — ED PROVIDER NOTE - PHYSICAL EXAMINATION
CONST: NAD  EYES: Sclera and conjunctiva clear.   ENT: Clear nasal discharge. Oropharynx normal appearing, no erythema or exudates. No abscess or swelling. Uvula midline.   NECK: Non-tender, no meningeal signs. normal ROM. supple   CARD: S1 S2; No jvd  RESP: Equal BS B/L, No wheezes, rhonchi or rales. No distress  GI: Soft, non-tender, non-distended. no cva tenderness. normal BS  MS: Normal ROM in all extremities. pulses 2 +. no calf tenderness or swelling  SKIN: Warm, dry, no acute rashes. Good turgor  NEURO: A&Ox3, No focal deficits. Strength 5/5 with no sensory deficits.

## 2021-12-15 NOTE — ED PROVIDER NOTE - OBJECTIVE STATEMENT
46y F pmh smoker presents for eval of nasal congestion. Pt states she woke yesterday with nasal congestion with np cough, no aggravating or relieving factors.

## 2021-12-15 NOTE — ED PROVIDER NOTE - ATTENDING CONTRIBUTION TO CARE
3 days of sinus congestion, ear fullness and rhinorrhea with loss of taste and smell no fevers. exam show no focal facial tenderness she reports some mild cough with these symptoms. oropharynx nml tms nml and lungs cta plan is to obtain cxr and viral swab

## 2021-12-15 NOTE — ED ADULT NURSE NOTE - OBJECTIVE STATEMENT
States she has been c/o sinus pressure and congestion, long before covid era. States she has been following up with ENT and had multiple ear surgeries. States she has also received an allergy treatment. Denies any fevers, but stated that she was having difficulty breathing thought her sinuses.

## 2021-12-15 NOTE — ED PROVIDER NOTE - NS ED ROS FT
Constitutional: (-) fever  Eyes/ENT: (+) nasal congestion, (-) blurry vision, (-) epistaxis  Cardiovascular: (-) chest pain, (-) syncope  Respiratory: (+) cough, (-) shortness of breath  Gastrointestinal: (-) vomiting, (-) diarrhea  : (-) dysuria, (-) hematuria  Musculoskeletal: (-) neck pain, (-) back pain, (-) joint pain  Integumentary: (-) rash, (-) edema  Neurological: (-) headache, (-) altered mental status  Allergic/Immunologic: (-) pruritus

## 2021-12-15 NOTE — ED PROVIDER NOTE - NSFOLLOWUPINSTRUCTIONS_ED_ALL_ED_FT
Follow up with PMD in 1-2 days.    Upper Respiratory Infection    A viral respiratory infection is an illness that affects parts of the body used for breathing, like the lungs, nose, and throat. It is caused by a germ called a virus. Symptoms can include runny nose, coughing, sneezing, fatigue, body aches, sore throat, fever, or headache. Over the counter medicine can be used to manage the symptoms but the infection typically goes away on its own in 5 to 10 days.     SEEK IMMEDIATE MEDICAL CARE IF YOU HAVE ANY OF THE FOLLOWING SYMPTOMS: shortness of breath, chest pain, fever over 10 days, or lightheadedness/dizziness.

## 2021-12-15 NOTE — ED ADULT TRIAGE NOTE - BP NONINVASIVE DIASTOLIC (MM HG)
Left message for patient to call back in regards to scheduling appointment with a doctor at [address] [place] [address]. [phone]

## 2021-12-15 NOTE — ED PROVIDER NOTE - PATIENT PORTAL LINK FT
You can access the FollowMyHealth Patient Portal offered by [facility] by registering at the following website: [email or web address]. By joining [employer]’s FollowMyHealth portal, you will also be able to view your health information using other applications (apps) compatible with our system.

## 2023-05-21 ENCOUNTER — EMERGENCY (EMERGENCY)
Facility: HOSPITAL | Age: 48
LOS: 0 days | Discharge: HOME | End: 2023-05-21
Attending: EMERGENCY MEDICINE
Payer: MEDICAID

## 2023-05-21 VITALS
HEART RATE: 18 BPM | RESPIRATION RATE: 97 BRPM | SYSTOLIC BLOOD PRESSURE: 125 MMHG | HEIGHT: 64 IN | OXYGEN SATURATION: 99 % | DIASTOLIC BLOOD PRESSURE: 78 MMHG | TEMPERATURE: 98 F | WEIGHT: 119.93 LBS
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
## 2023-05-21 DIAGNOSIS — H57.89 OTHER SPECIFIED DISORDERS OF EYE AND ADNEXA: ICD-10-CM
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
## 2023-05-21 DIAGNOSIS — F17.200 NICOTINE DEPENDENCE, UNSPECIFIED, UNCOMPLICATED: ICD-10-CM

## 2023-05-21 DIAGNOSIS — S05.02XA INJURY OF CONJUNCTIVA AND CORNEAL ABRASION WITHOUT FOREIGN BODY, LEFT EYE, INITIAL ENCOUNTER: ICD-10-CM

## 2023-05-21 DIAGNOSIS — H53.8 OTHER VISUAL DISTURBANCES: ICD-10-CM

## 2023-05-21 DIAGNOSIS — Z23 ENCOUNTER FOR IMMUNIZATION: ICD-10-CM

## 2023-05-21 PROCEDURE — 90471 IMMUNIZATION ADMIN: CPT

## 2023-05-21 PROCEDURE — 90715 TDAP VACCINE 7 YRS/> IM: CPT

## 2023-05-21 PROCEDURE — 99284 EMERGENCY DEPT VISIT MOD MDM: CPT

## 2023-05-21 PROCEDURE — 99283 EMERGENCY DEPT VISIT LOW MDM: CPT | Mod: 25

## 2023-05-21 RX ORDER — ERYTHROMYCIN BASE 5 MG/GRAM
1 OINTMENT (GRAM) OPHTHALMIC (EYE)
Qty: 1 | Refills: 0
Start: 2023-05-21 | End: 2023-05-25

## 2023-05-21 RX ORDER — TETANUS TOXOID, REDUCED DIPHTHERIA TOXOID AND ACELLULAR PERTUSSIS VACCINE, ADSORBED 5; 2.5; 8; 8; 2.5 [IU]/.5ML; [IU]/.5ML; UG/.5ML; UG/.5ML; UG/.5ML
0.5 SUSPENSION INTRAMUSCULAR ONCE
Refills: 0 | Status: COMPLETED | OUTPATIENT
Start: 2023-05-21 | End: 2023-05-21

## 2023-05-21 RX ADMIN — TETANUS TOXOID, REDUCED DIPHTHERIA TOXOID AND ACELLULAR PERTUSSIS VACCINE, ADSORBED 0.5 MILLILITER(S): 5; 2.5; 8; 8; 2.5 SUSPENSION INTRAMUSCULAR at 10:07

## 2023-05-21 NOTE — ED PROVIDER NOTE - CLINICAL SUMMARY MEDICAL DECISION MAKING FREE TEXT BOX
pt found to have corneal abrasion on fluorescein testing. negative [facility]. will dc home w erythromycin ointment 4x per day x 5 days. f/u eye clinic this week. strict return precautions provided. pt found to have corneal abrasion on fluorescein testing. negative [facility]. tdap given in ED given injury was with needle.  will dc home w erythromycin ointment 4x per day x 5 days. f/u eye clinic this week. strict return precautions provided.

## 2023-05-21 NOTE — ED PROVIDER NOTE - PATIENT PORTAL LINK FT
You can access the FollowMyHealth Patient Portal offered by [facility] by registering at the following website: [email or web address]. By joining [employer]’s FollowMyHealth portal, you will also be able to view your health information using other applications (apps) compatible with our system.

## 2023-05-21 NOTE — ED PROVIDER NOTE - OBJECTIVE STATEMENT
47-year-old female no past medical history, wears glasses but not contacts, presents with left eye discomfort tearing burning blurry vision since yesterday.  Patient states she was getting her hair done with a needle having extensions placed when the needle poked her in the left eye yesterday.  Patient flushed with water.  Still having symptoms so came to ED.  No bleeding or discharge.  No numbness weakness.  No other injury sustained. 47-year-old female no past medical history, wears glasses but not contacts, presents with left eye discomfort tearing burning blurry vision since yesterday.  Patient states she was getting her hair done with a needle having extensions placed when the needle poked her in the left eye yesterday.  No bleeding.  Patient flushed with water.  Still having symptoms so came to ED.  No bleeding or discharge.  No numbness weakness.  No other injury sustained.

## 2023-05-21 NOTE — ED PROVIDER NOTE - NSFOLLOWUPINSTRUCTIONS_ED_ALL_ED_FT
Corneal Abrasion    The cornea is the clear covering at the front and center of the eye. This very thin tissue is made up of many layers. If a scratch or injury causes the corneal epithelium to come off, it is called a corneal abrasion. Symptoms include eye pain, difficulty keeping eye open, and light sensitivity. Do not drive or operate machinery if your eye is patched.    SEEK MEDICAL CARE IF YOU HAVE THE FOLLOWING SYMPTOMS: discharge from eyes, changes in vision, worsening of symptoms.    Our Emergency Department Referral Coordinators will be reaching out to you in the next 24-48 hours from 9:00am to 5:00pm with a follow up appointment. Please expect a phone call from the hospital in that time frame. If you do not receive a call or if you have any questions or concerns, you can reach them at   [phone]

## 2023-05-21 NOTE — ED PROVIDER NOTE - NSFOLLOWUPCLINICS_GEN_ALL_ED_FT
[facility]  Ophthalmolgy  [address], [address]  [place] [address]  Phone: [phone]  Fax:   Follow Up Time: 1-3 Days

## 2023-05-21 NOTE — ED PROVIDER NOTE - PHYSICAL EXAMINATION
VITAL SIGNS: AFebrile, vital signs stable  CONSTITUTIONAL: Well-developed; well-nourished; in no acute distress.  SKIN: Skin exam is warm and dry, no acute rash.  HEAD: Normocephalic; atraumatic.  EYES: Pupils equal round reactive to light, Extraocular movements intact; conjunctiva and sclera clear. Tearing. no proptosis. no periorbital erythema/edema. no pain with eye movements. mild conjunctival injection. corneal abrasion present, negative roma's sign with fluorescein testing   ENT: No nasal discharge; airway clear. Moist mucus membranes.  NECK: Supple; non tender. No rigidity  EXT: Normal ROM. No clubbing, cyanosis or edema. No calf tenderness to palpation.  NEURO: Alert and oriented x 3. No focal deficits.  PSYCH: Cooperative, appropriate. VITAL SIGNS: AFebrile, vital signs stable  CONSTITUTIONAL: Well-developed; well-nourished; in no acute distress.  SKIN: Skin exam is warm and dry, no acute rash.  HEAD: Normocephalic; atraumatic.  EYES: Pupils equal round reactive to light, Extraocular movements intact; conjunctiva and sclera clear. Tearing. no proptosis. no periorbital erythema/edema. no pain with eye movements. mild conjunctival injection. corneal abrasion present, negative roma's sign with fluorescein testing. superficial abrasion. no laceration or puncture wound. no bleeding.   ENT: No nasal discharge; airway clear. Moist mucus membranes.  NECK: Supple; non tender. No rigidity  EXT: Normal ROM. No clubbing, cyanosis or edema. No calf tenderness to palpation.  NEURO: Alert and oriented x 3. No focal deficits.  PSYCH: Cooperative, appropriate.

## 2023-05-23 ENCOUNTER — APPOINTMENT (OUTPATIENT)
Dept: OPHTHALMOLOGY | Facility: CLINIC | Age: 48
End: 2023-05-23

## 2024-09-07 NOTE — ED ADULT NURSE NOTE - NSIMPLEMENTINTERV_GEN_ALL_ED
GYNECOLOGIC EXAMINATION    CC:   Chief Complaint   Patient presents with   • Gyn Exam     Annual, US breast 24, mammo 23, last pap 23           HPI: Yu Pascual is a 54 year old female  Patient's last menstrual period was 2021. present for annual exam    No gyne complaint  Menopausal symptoms - NOT MUCH   No incontinence  YES  health changes HAD LEFT LUMPECTOMY AND ATYPICAL DUCTAL HYPERPLASIA AND FOLLOWED WITH DR. BORDEN CLOSELY EVERY 6 MONTHS    Sexually active YES (VAGINAL DRYNESS BUT NOT AN ISSUE)    WEIGHT STABLE    Menstrual Hx:  Patient's last menstrual period was 2021.       GYN Hx:  Feels safe in relationship   25 years   Menarche: 12 yo  Menopause: AGE 51 YO   Sexually active: yes   STD: no  Birth control:na  Last Pap:  neg/neg;2023 NEG,NEG  Abnormal Pap: never   Colposcopy: never   Self Breast Exam: yes   Mammography: 2020 neg; ABUS WNL  Dexa: na  Colonoscopy: 10/2022 no polyps  Blood work up done with PCP    Eldest sister dx breast ca at age 44 yo --> still living for 11 years had mastectomy and chemo and radiation  Second sister dx breast ca at age 52 --> lumpectomy and radiation    ROS  General/Constitutional Denies.    Urology Denies.    Integumentary Denies.    Women Only Denies.    Genitourinary Denies.    Skin Denies.    Gastrointestinal Denies.    Endocrine Denies.    Psychiatric Denies.    Health Education Admits.     Please review HPI for any of my pertinent findings.     Depression Screening:  PHQ2/9:  Initial depression screening score:: 0.  Recent PHQ 2/9 Score    PHQ 2:  PHQ 2 Score Adult PHQ 2 Score Adult PHQ 2 Interpretation Little interest or pleasure in activity?   2024  10:42 AM 0 No further screening needed 0       PHQ 9:     PHQ-2/9 Depression Screening  Little interest or pleasure in activity?: Not at all  Feeling down, depressed or hopeless?: Not at all  Initial depression screening score:: 0  PHQ2 Interpretation:  No further screening needed       Allergies: ALLERGIES:  No Known Allergies    Medical Hx:   Past Medical History:   Diagnosis Date   • Atypical ductal hyperplasia of left breast 2024    and papilloma   • Essential (primary) hypertension         Surgical Hx:  Past Surgical History:   Procedure Laterality Date   • Breast surgery Left 2024   • Open access colonoscopy         OB Hx:  OB History    Para Term  AB Living   3 2 2   1 2   SAB IAB Ectopic Molar Multiple Live Births           0 2      # Outcome Date GA Lbr Rolando/2nd Weight Sex Type Anes PTL Lv   3 AB 2015 12w0d    Miscar      2 Term 05 40w0d  3.43 kg (7 lb 9 oz) F Vag-Spont   ABBY   1 Term 10/13/01 40w0d  3.544 kg (7 lb 13 oz) F Vag-Spont   ABBY        Medication:   Current Outpatient Medications   Medication Sig Dispense Refill   • losartan (COZAAR) 50 MG tablet Take 50 mg by mouth daily.     • losartan (COZAAR) 25 MG tablet Take 25 mg by mouth daily. (Patient not taking: Reported on 2024)     • fexofenadine (ALLEGRA) 180 MG tablet Take 180 mg by mouth as needed. Indications: Hayfever     • Multiple Vitamins-Minerals (MULTIVITAMIN ADULT PO) Take 1 tablet by mouth daily.       No current facility-administered medications for this visit.        Family Hx:   Family History   Problem Relation Age of Onset   • Diabetes Mother    • Hypertension Mother    • Diabetes Father    • Cancer, Breast Sister    • Cancer, Breast Sister    • Cancer, Breast Maternal Aunt        Social Hx:  Social History     Tobacco Use   • Smoking status: Never   • Smokeless tobacco: Never   Vaping Use   • Vaping status: never used   Substance Use Topics   • Alcohol use: Never   • Drug use: Never        Histories were reviewed and updated during today's visit.    PHYSICAL EXAM  Visit Vitals  /78 (BP Location: RUE - Right upper extremity)   Pulse 79   Resp 16   Ht 5' 4\" (1.626 m)   Wt 74.6 kg (164 lb 7.4 oz)   LMP 2021   SpO2 97%   BMI 28.23 kg/m²      GENERAL APEARANCE:  The patient is a pleasant, normal appearing female with normal affect and in no distress, appropriate mood and affect, A & O.  SKIN:   Warm and dry to touch.  No lesions or rashes noted.   NECK:  No cervical lymphadenopathy.  No thyromegaly, no nodules palpated.  LUNGS:  Clear bilaterally to ascultation.   HEART:    Regular rate and rhythm.  No murmurs noted.    BREASTS:  Symmetrical, no masses, tenderness or skin changes, no axillary adenopathy.LEFT BREAST SCAR HEALED VERY WELL AROUND THE NIPPLE; SKIN NIPPLE TAG ON THE RIGHT BREAST  ABDOMEN:  Soft, non-tender, non-distended.  UTERUS:  Normal size, shape and consistency. AV 8 WEEK   CERVIX:  Normal, no CMT. MULTIP  VAGINA:   Speculum exam reveals  PALE SLIGHT ATROPHIC vaginal mucosa.   ADNEXA:  No masses or tenderness bilaterally.   EXTERNAL GENITALIA/PERINEUM: Normal, no lesions or masses noted    PROCEDURE  SKIN TAG ON THE RIGHT NIPPLE TIED AT THE BASE WITH 2-0 VICYRL.  BANDAID PLACED (WILL FALL OFF IN 5-7 DAYS )     ASSESSMENT  Diagnoses and all orders for this visit:  Well woman exam with routine gynecological exam  Comments:  TAKE MULTIVIT WOMEN 50+ DAILY  Low vitamin D level  Comments:  INCREASE TO VIT D 2000 UNITS  (FROM 1000 UNIT)  FH: breast cancer  -     US PELVIS NON-OB EXTERNAL TRANSABDOMINAL AND INTERNAL TRANSVAGINAL; Future  Abdominal bloating  -     US PELVIS NON-OB EXTERNAL TRANSABDOMINAL AND INTERNAL TRANSVAGINAL; Future  Skin tag  Comments:  BY THE NIPPLE  AND TIED AT THE BASE      PLAN  IF ANY ISSUES WITH THE SKIN TAG LET ME KNOW  Mammogram ORDERS WITH DR OCASIO  Colonoscopy up to date  Blood work up to date    Screening: diet and exercise counseling performed.              Intimate partner violence: screen neg             Sexual dysfunction: screen neg             Bladder/bowel dysfunction: screen neg             Obesity screening: screen neg      Return in about 1 year (around 9/7/2025) for Gyne.    All pt questions  were answered & pt agrees with plan     Yesim FLOYD Kelly MD  9/7/2024    Implemented All Universal Safety Interventions:  [place] to call system. Call bell, personal items and telephone within reach. Instruct patient to call for assistance. Room bathroom lighting operational. Non-slip footwear when patient is off stretcher. Physically safe environment: no spills, clutter or unnecessary equipment. Stretcher in lowest position, wheels locked, appropriate side rails in place.

## 2024-12-09 ENCOUNTER — EMERGENCY (EMERGENCY)
Facility: HOSPITAL | Age: 49
LOS: 0 days | Discharge: HOME | End: 2024-12-09
Attending: STUDENT IN AN ORGANIZED HEALTH CARE EDUCATION/TRAINING PROGRAM
Payer: MEDICAID

## 2024-12-09 VITALS
OXYGEN SATURATION: 98 % | WEIGHT: 120.81 LBS | DIASTOLIC BLOOD PRESSURE: 83 MMHG | SYSTOLIC BLOOD PRESSURE: 119 MMHG | HEART RATE: 99 BPM | RESPIRATION RATE: 17 BRPM | TEMPERATURE: 99 F

## 2024-12-09 DIAGNOSIS — N64.4 MASTODYNIA: ICD-10-CM

## 2024-12-09 PROCEDURE — 96372 THER/PROPH/DIAG INJ SC/IM: CPT

## 2024-12-09 PROCEDURE — 81025 URINE PREGNANCY TEST: CPT

## 2024-12-09 PROCEDURE — 99283 EMERGENCY DEPT VISIT LOW MDM: CPT | Mod: 25

## 2024-12-09 PROCEDURE — 99284 EMERGENCY DEPT VISIT MOD MDM: CPT

## 2024-12-09 RX ORDER — KETOROLAC TROMETHAMINE 30 MG/ML
60 INJECTION INTRAMUSCULAR; INTRAVENOUS ONCE
Refills: 0 | Status: DISCONTINUED | OUTPATIENT
Start: 2024-12-09 | End: 2024-12-09

## 2024-12-09 RX ADMIN — KETOROLAC TROMETHAMINE 60 MILLIGRAM(S): 30 INJECTION INTRAMUSCULAR; INTRAVENOUS at 17:23

## 2024-12-09 NOTE — ED PROVIDER NOTE - OBJECTIVE STATEMENT
49-year-old female no past medical history presents to the ED complaining of bilateral breast soreness for couple weeks.  Patient denies any fever, chills.  Patient states last mammogram 4 years ago.  Patient denies any family history of breast cancer.

## 2024-12-09 NOTE — ED PROVIDER NOTE - SKIN WOUND TYPE
"  CRITICAL CARE ADMISSION NOTE    Chief Complaint     Sepsis    History of Present Illness     Harris Shane is a 89 yo male who presented to [facility] ED last night at 2042 c/o bilateral lower extremity weakness.   At baseline the patient is able to ambulate with the assistance of walker, however, for the past day, patient has been unable to get out of bed secondary to diffuse weakness, particularly in his bilateral lower extremities. He was unable to walk to supper this evening, which concerned his children and prompted them to bring him to the emergency room. He does admit to some mild chest \"ache\" earlier in the morning but they resolved on their own.  He has also had SOB and some abdominal discomfort when he was struggling to get up and was unable.      He has a history of prostate cancer diagnoses three years ago and was treated with injections and radiation per Dr Mejia, oncologist at [facility]). He is currently on Bicalutamide. He was recently diagnosed with spine malignancy at L5 with plans for radiation therapy to start today (6/13/18) at [facility].  He has had BLE pain related to this, R>L, for several months now.    In the ED he was found to be hypotensive with lactate 3.1 and WBC 12.93. Procalcitonin pending. His liver enzymes were mildly elevated ([id number], ) as well as his his creatine 1.43 and troponin 0.26> 0.45. CT abdomen and pelvis revealed cholelithiasis at GB neck, prostate enlargement, and colonic diverticulosis without diverticulitis.  Radiology report also noted L4, L5 osteoblastic metastases,  LLL atelectasis/infiltrate, and a chronic large hiatal hernia with organorotation of stomach. Mild peripancreatic edema at head of gland was also seen.    The patient received 2 liters of NS with marginal improvement in BP initially. Azithromycin, Rocephin, and Flagyl was given in the ED for probable sepsis related to pneumonia and intra abdominal infection.  Th e patient was initially going to be admitted per " "Hospitalist group, however there was concern for marginal BP and it was decided that the patient would need closer observation in the ICU.    Currently the patient is not requiring vasopressor support. Indicates the abdominal pain he has yesterday has improved. Denies chest pain.    Problem List, Surgical History, Family, Social History, and ROS     Patient Active Problem List   Diagnosis   • Sepsis   • Elevated liver enzymes   • Prostate cancer (Mets to L4-L5)   • Pulmonary infiltrate, LLLobe   • Cholelithiases of GB neck per CT A/P   • Acute renal insufficiency, CRE 1.43, unknown baseline   • Blindness of right eye   • WILLIAM (obstructive sleep apnea) remote, intolerant of CPAP   • R/O Cholecystitis   • NSTEMI (non-ST elevated myocardial infarction) (R/O Type 2)     Past Surgical History:   Procedure Laterality Date   • EYE SURGERY      RIGHT       Allergies   Allergen Reactions   • Contrast Dye Rash and Other (See Comments)     RASH AND SYNCOPE       MEDICATION LIST AND ALLERGIES REVIEWED.    Family History   Problem Relation Age of Onset   • Breast cancer Mother    • Prostate cancer Father    • Coronary artery disease Father    • No Known Problems Sister    • Stroke Brother    • Coronary artery disease Brother      Social History   Substance Use Topics   • Smoking status: Former Smoker     Types: Pipe   • Smokeless tobacco: Never Used      Comment: QUIT IN THE 70'S   • Alcohol use No     Social History     Social History Narrative    Patient is  and lives with his spouse and one of his daughters.     FAMILY AND SOCIAL HISTORY REVIEWED.    Review of Systems   Constitutional: Positive for fatigue. Negative for chills and fever.   Eyes:        Right eye blindness, chronic   Respiratory: Positive for shortness of breath and wheezing (upper airway, chronic). Negative for cough.    Cardiovascular: Positive for chest pain (chest \"aches\", now resolved) and leg swelling.   Gastrointestinal: Positive for abdominal " "pain and constipation. Negative for diarrhea, nausea and vomiting.   Endocrine: Negative.    Genitourinary: Positive for dysuria and frequency.   Musculoskeletal: Positive for arthralgias, back pain and gait problem.   Skin: Negative.    Allergic/Immunologic: Negative.    Neurological: Positive for weakness (generalized). Negative for speech difficulty and headaches.   Hematological: Negative.    Psychiatric/Behavioral: Negative.      ALL OTHER SYSTEMS REVIEWED AND ARE NEGATIVE.    Physical Exam and Clinical Information   /87   Pulse 64   Temp 97.7 °F (36.5 °C) (Oral)   Resp 18   Ht 170.2 cm (67\")   Wt 86.2 kg (190 lb)   SpO2 96%   BMI 29.76 kg/m²   Physical Exam:   GENERAL: Awake, no distress   HEENT: No adednopathy or thyromegaly   LUNGS: Basilar crackles, no wheezes   HEART: HRR without murmurs   ABDOMEN: Soft, non-tender. Few BS noted.   EXTREMITIES: Trace edema, no cyanosis   NEURO/PSYCH: Awake, alert, conversant, no focal motor defecit      Results from last 7 days  Lab Units 06/13/18  0509 06/12/18  2152   WBC 10*3/mm3 20.12* 12.93*   HEMOGLOBIN g/dL 12.0* 13.1   PLATELETS 10*3/mm3 153 168       Results from last 7 days  Lab Units 06/13/18  0509 06/12/18  2152   SODIUM mmol/L 134 137   POTASSIUM mmol/L 4.2 3.3*   CO2 mmol/L 24.0 25.0   BUN mg/dL 23 24*   CREATININE mg/dL 1.37* 1.43*   MAGNESIUM mg/dL 2.1 2.0   PHOSPHORUS mg/dL 3.6  --    GLUCOSE mg/dL 134* 143*     Estimated Creatinine Clearance: 37.6 mL/min (A) (by C-G formula based on SCr of 1.37 mg/dL (H)).          Lab Results   Component Value Date    LACTATE 2.3 (C) 06/13/2018        IMAGES:     CT ABDOMEN AND PELVIS:  IMPRESSION:  1.  Prostate enlargement.  2.  Cholelithiasis at GB neck.  3.  Nonspecific bowel gas pattern without dilatation or obstruction.  4.  Colonic diverticulosis without diverticulitis.  5.  Suspicious for L4, L5 osteoblastic metastases.  6.  LLL atelectasis/infiltrate.  7.  Chronic large hiatal hernia with " organorotation of stomach.  8.  CAD.  9.  Chronic right renal nodularity unchanged.  10.  Mild peripancreatic edema at head of gland, consider serum lipase.  11.  Additional findings, as above.     I reviewed the patient's results and images.     [facility] Problem List     * (Principal)Sepsis    Cholelithiases of GB neck per CT A/P    R/O Cholecystitis    Pulmonary infiltrate, LLLobe    Acute renal insufficiency, CRE 1.43, unknown baseline    NSTEMI (non-ST elevated myocardial infarction) (R/O Type 2)    Elevated liver enzymes    Prostate cancer (Mets to L4-L5)    Blindness of right eye    WILLIAM (obstructive sleep apnea) remote, intolerant of CPAP        Plan/Recommendations     ICU admission  Monitor BP closely  Continue gentle hydration  Pan cultures  ABX coverage  Heparin for DVT and PPI for GI prophylaxis  Follow labs in am, including serial troponin  ECHO in am  GI consultation  Abdominal ultrasound  Consider HIDA scan  Consider Cardiology consultation if troponin continues to rise; currently no evidence of STEMI    Critical Care time spent in direct patient care: 40 minutes (excluding procedure time, if applicable) including high complexity decision making to assess, manipulate, and support vital organ system failure in this individual who has impairment of one or more vital organ systems such that there is a high probability of imminent or life threatening deterioration in the patient’s condition.    CHELITA Coy  Pulmonary and Critical Care Medicine  06/13/18 6:21 AM     I have seen and examined patient, performing a face-to-face diagnostic evaluation with plan of care reviewed and developed with APRN and nursing staff. I have addended and modified the above history of present illness, physical examination, and assessment and plan to reflect my findings and impressions.    SYLVIA Duran MD  Pulmonary and Critical Care Medicine     CC: Charli Mccormack MD       breast exam done with Dr Fernández, no redness/ swelling; nipple are normal, no discharge

## 2024-12-09 NOTE — ED PROVIDER NOTE - CLINICAL SUMMARY MEDICAL DECISION MAKING FREE TEXT BOX
Patient presented today with bilateral breast soreness.  Patient has no evidence of fluctuance or infection at this time.  Will discharge to follow-up outpatient with breast surgery/clinic for MRI and further evaluation.

## 2024-12-09 NOTE — ED PROVIDER NOTE - NSFOLLOWUPINSTRUCTIONS_ED_ALL_ED_FT
Our Emergency Department Referral Coordinators will be reaching out to you in the next 24-48 hours from 9:00am to 5:00pm with a follow up appointment. Please expect a phone call from the hospital in that time frame. If you do not receive a call or if you have any questions or concerns, you can reach them at [phone].    Breast Tenderness    Breast tenderness is a common problem for women of all ages. Breast tenderness may cause mild discomfort to severe pain. It has a variety of causes. Your health care provider will find out the likely cause of your breast tenderness by examining your breasts, asking you about symptoms, and ordering some tests.    HOME CARE INSTRUCTIONS  Breast tenderness often can be handled at home. You can try:    Getting fitted for a new bra that provides more support, especially during exercise.  Wearing a more supportive bra or sports bra while sleeping when your breasts are very tender.  If you have a breast injury, apply ice to the area:  Put ice in a plastic bag.  Place a towel between your skin and the bag.  Leave the ice on for 20 minutes, 2–3 times a day.   If your breasts are too full of milk as a result of breastfeeding, try:  Expressing milk either by hand or with a breast pump.  Applying a warm compress to the breasts for relief.  Taking over-the-counter pain relievers, if approved by your health care provider.  Taking other medicines that your health care provider prescribes. These may include antibiotic medicines or birth control pills.    Over the long term, your breast tenderness might be eased if you:    Cut down on caffeine.  Reduce the amount of fat in your diet.    Keep a log of the days and times when your breasts are most tender. This will help you and your health care provider find the cause of the tenderness and how to relieve it. Also, learn how to do breast exams at home. This will help you notice if you have an unusual growth or lump that could cause tenderness.    SEEK MEDICAL CARE IF:  Any part of your breast is hard, red, and hot to the touch. This could be a sign of infection.  Fluid is coming out of your nipples (and you are not breastfeeding). Especially watch for blood or pus.  You have a fever as well as breast tenderness.  You have a new or painful lump in your breast that remains after your menstrual period ends.  You have tried to take care of the pain at home, but it has not gone away.  Your breast pain is getting worse, or the pain is making it hard to do the things you usually do during your day.    ADDITIONAL NOTES AND INSTRUCTIONS    Please follow up with your Primary MD in 24-48 hr.  Seek immediate medical care for any new/worsening signs or symptoms.

## 2024-12-09 NOTE — ED PROVIDER NOTE - PATIENT PORTAL LINK FT
You can access the FollowMyHealth Patient Portal offered by [facility] by registering at the following website: [email or web address]. By joining [employer]’s FollowMyHealth portal, you will also be able to view your health information using other applications (apps) compatible with our system.

## 2024-12-10 NOTE — CHART NOTE - NSCHARTNOTEFT_GEN_A_CORE
Appt scheduled 12/18/2024 11:30 AM w/ Dr. Fraga @ [facility] Radha Sharma (breast surgery referral).

## 2024-12-18 ENCOUNTER — APPOINTMENT (OUTPATIENT)
Dept: BREAST CENTER | Facility: CLINIC | Age: 49
End: 2024-12-18
Payer: MEDICAID

## 2024-12-18 VITALS
DIASTOLIC BLOOD PRESSURE: 74 MMHG | BODY MASS INDEX: 22.08 KG/M2 | SYSTOLIC BLOOD PRESSURE: 98 MMHG | HEART RATE: 87 BPM | HEIGHT: 62 IN | WEIGHT: 120 LBS

## 2024-12-18 DIAGNOSIS — N64.4 MASTODYNIA: ICD-10-CM

## 2024-12-18 PROCEDURE — 99203 OFFICE O/P NEW LOW 30 MIN: CPT

## 2024-12-26 ENCOUNTER — RESULT REVIEW (OUTPATIENT)
Age: 49
End: 2024-12-26

## 2024-12-26 ENCOUNTER — OUTPATIENT (OUTPATIENT)
Dept: OUTPATIENT SERVICES | Facility: HOSPITAL | Age: 49
LOS: 1 days | End: 2024-12-26
Payer: MEDICAID

## 2024-12-26 DIAGNOSIS — N64.4 MASTODYNIA: ICD-10-CM

## 2024-12-26 DIAGNOSIS — R92.8 OTHER ABNORMAL AND INCONCLUSIVE FINDINGS ON DIAGNOSTIC IMAGING OF BREAST: ICD-10-CM

## 2024-12-26 PROCEDURE — 77066 DX MAMMO INCL CAD BI: CPT

## 2024-12-26 PROCEDURE — 76641 ULTRASOUND BREAST COMPLETE: CPT | Mod: 50

## 2024-12-26 PROCEDURE — 77066 DX MAMMO INCL CAD BI: CPT | Mod: 26

## 2024-12-26 PROCEDURE — 76641 ULTRASOUND BREAST COMPLETE: CPT | Mod: 26,50

## 2024-12-26 PROCEDURE — G0279: CPT

## 2024-12-26 PROCEDURE — 77062 BREAST TOMOSYNTHESIS BI: CPT | Mod: 26

## 2024-12-27 DIAGNOSIS — N64.4 MASTODYNIA: ICD-10-CM

## 2024-12-30 ENCOUNTER — APPOINTMENT (OUTPATIENT)
Dept: BREAST CENTER | Facility: CLINIC | Age: 49
End: 2024-12-30

## 2025-01-05 ENCOUNTER — EMERGENCY (EMERGENCY)
Facility: HOSPITAL | Age: 50
LOS: 0 days | Discharge: HOME | End: 2025-01-05
Attending: EMERGENCY MEDICINE
Payer: MEDICAID

## 2025-01-05 VITALS
HEART RATE: 89 BPM | TEMPERATURE: 98 F | OXYGEN SATURATION: 98 % | SYSTOLIC BLOOD PRESSURE: 113 MMHG | RESPIRATION RATE: 16 BRPM | DIASTOLIC BLOOD PRESSURE: 77 MMHG | WEIGHT: 119.93 LBS

## 2025-01-05 DIAGNOSIS — M79.604 PAIN IN RIGHT LEG: ICD-10-CM

## 2025-01-05 DIAGNOSIS — M25.571 PAIN IN RIGHT ANKLE AND JOINTS OF RIGHT FOOT: ICD-10-CM

## 2025-01-05 PROCEDURE — 73600 X-RAY EXAM OF ANKLE: CPT | Mod: 26,RT

## 2025-01-05 PROCEDURE — 99284 EMERGENCY DEPT VISIT MOD MDM: CPT | Mod: 25

## 2025-01-05 PROCEDURE — 73590 X-RAY EXAM OF LOWER LEG: CPT | Mod: RT

## 2025-01-05 PROCEDURE — 29515 APPLICATION SHORT LEG SPLINT: CPT | Mod: RT

## 2025-01-05 PROCEDURE — 99285 EMERGENCY DEPT VISIT HI MDM: CPT | Mod: 25

## 2025-01-05 PROCEDURE — 73590 X-RAY EXAM OF LOWER LEG: CPT | Mod: 26,RT

## 2025-01-05 PROCEDURE — 93971 EXTREMITY STUDY: CPT | Mod: 26,RT

## 2025-01-05 PROCEDURE — 73600 X-RAY EXAM OF ANKLE: CPT | Mod: RT

## 2025-01-05 PROCEDURE — 93971 EXTREMITY STUDY: CPT | Mod: RT

## 2025-01-05 RX ORDER — IBUPROFEN 200 MG
600 TABLET ORAL ONCE
Refills: 0 | Status: COMPLETED | OUTPATIENT
Start: 2025-01-05 | End: 2025-01-05

## 2025-01-05 RX ORDER — ACETAMINOPHEN 80 MG/.8ML
650 SOLUTION/ DROPS ORAL ONCE
Refills: 0 | Status: COMPLETED | OUTPATIENT
Start: 2025-01-05 | End: 2025-01-05

## 2025-01-05 RX ORDER — DEXAMETHASONE SODIUM PHOSPHATE 4 MG/ML
10 VIAL (ML) INJECTION ONCE
Refills: 0 | Status: COMPLETED | OUTPATIENT
Start: 2025-01-05 | End: 2025-01-05

## 2025-01-05 RX ORDER — KETOROLAC TROMETHAMINE 30 MG/ML
30 INJECTION INTRAMUSCULAR; INTRAVENOUS ONCE
Refills: 0 | Status: DISCONTINUED | OUTPATIENT
Start: 2025-01-05 | End: 2025-01-05

## 2025-01-05 RX ADMIN — Medication 600 MILLIGRAM(S): at 08:25

## 2025-01-05 RX ADMIN — Medication 10 MILLIGRAM(S): at 08:15

## 2025-01-05 RX ADMIN — ACETAMINOPHEN 650 MILLIGRAM(S): 80 SOLUTION/ DROPS ORAL at 07:50

## 2025-01-05 NOTE — ED PROVIDER NOTE - PATIENT PORTAL LINK FT
You can access the FollowMyHealth Patient Portal offered by [facility] by registering at the following website: [email or web address]. By joining [employer]’s FollowMyHealth portal, you will also be able to view your health information using other applications (apps) compatible with our system.

## 2025-01-05 NOTE — ED PROVIDER NOTE - ATTENDING CONTRIBUTION TO CARE
49F no pmh, p/w R achilles/ankle pain since yesterday. felt a pulling sensation in back of ankle yesterday while sitting in chair. no fall. worse today, unable to ambulate due to pain. No numbness, weakness. no fever. no redness. mild sweling. no immobilization or hormone use.     on exam, AFVSS, well crystal nad, ncat, eomi, perrla, mmm, R ankle/achilles ttp and swelling, no erythema or warmth, FROM active and passive, skin intact,  5/5 motor, silt, 2+ dp pulse, aaox3, no focal deficits, no le edema or calf ttp,     a/p; R ankle/achilles pain, atraumatic, doppler neg for DVT, XR no fx/dislocation, unable to bear weight- placed in splint with crutches, dc home f/u ortho 1 week, strict return precautions

## 2025-01-05 NOTE — ED PROVIDER NOTE - PHYSICAL EXAMINATION
CONSTITUTIONAL: NAD  SKIN: Warm dry  HEAD: NCAT  EYES: NL inspection  ENT: MMM  NECK: Supple; non tender.  CARD: RRR  RESP: No respiratory distress  ABD: S/NT no R/G  EXT: no pedal edema. pain/swelling to right achillies. No calf tenderness. Pulse/ motor/ sensory intact in RLE  NEURO: Grossly unremarkable  PSYCH: Cooperative, appropriate.

## 2025-01-05 NOTE — ED PROVIDER NOTE - OBJECTIVE STATEMENT
49-year-old female no significant past medical history presents to the ED complaining of right ankle/leg pain that suddenly began yesterday while she was sitting on the recliner.  Patient denied any trauma/mis-stepping or any known contributing factors.

## 2025-01-05 NOTE — ED PROVIDER NOTE - NSFOLLOWUPINSTRUCTIONS_ED_ALL_ED_FT
Our Emergency Department Referral Coordinators will be reaching out to you in the next 24-48 hours from 9:00am to 5:00pm to schedule a follow up appointment. Please expect a phone call from the hospital in that time frame. If you do not receive a call or if you have any questions or concerns, you can reach them at   [phone]     Achilles Tendinitis  Tendons and bones of the lower leg and foot, showing an inflamed Achilles tendon.  Achilles tendinitis is inflammation of the tough, cord-like band that connects the lower leg muscles to the heel bone (Achilles tendon). This is often caused by using the tendon and ankle joint too much.    In most cases, Achilles tendinitis gets better over time with treatment and home care. It can take weeks or months to fully heal.    What are the causes?  This condition may be caused by:  A sudden increase in exercise or activity, such as running.  Doing the same exercises or activities, such as jumping, over and over.  Not warming up your calf muscles before you exercise.  Exercising in shoes that are worn out or not made for exercise.  Having arthritis or a bone growth (spur) on the back of your heel. This can rub against the tendon and hurt it.  Age-related wear and tear. Tendons become less flexible with age and are more likely to be injured.  What are the signs or symptoms?  Common symptoms of this condition include:  Pain in your Achilles tendon or in the back of your leg, just above your heel. The pain may get worse when you exercise.  Stiffness or soreness in the back of your leg. You may feel it most often in the morning.  Swelling of the skin over the Achilles tendon.  Thickening of the tendon.  Trouble standing on tiptoe.  How is this diagnosed?  This condition is diagnosed based on your symptoms and a physical exam. You may also have tests, such as:  X-rays.  MRI.  Ultrasound.  How is this treated?  The goal of treatment is to relieve symptoms and help your injury heal. You may need to:  Decrease or stop activities that caused the tendinitis. You may be told to switch to low-impact exercises like biking or swimming.  Ice the injured area.  Do physical therapy. This may include strengthening and stretching exercises.  Take NSAIDs, such as ibuprofen. These can help with pain and swelling.  Use supportive shoes, wraps, heel lifts, or a walking boot (air cast).  Have surgery. This may be done if your symptoms do not get better with other treatments.  Use high-energy waves to start the healing process (extracorporeal shock wave therapy). This is rare.  Get an injection of medicines that help with inflammation (corticosteroids). This is rare.  Follow these instructions at home:  If you have an air cast:    Wear the air cast as told by your health care provider. Remove it only as told by your provider.  Check the skin around the air cast every day. Tell your provider about any concerns.  Loosen the air cast if your toes tingle, become numb, or turn cold and blue.  Keep the air cast clean.  If the air cast is not waterproof:  Do not let it get wet.  Cover it with a watertight covering when you take a bath or shower.  Managing pain, stiffness, and swelling    A bag of ice on a towel on the skin.  If told, put ice on the injured area.  If you have a removable air cast, remove it as told by your provider.  Put ice in a plastic bag.  Place a towel between your skin and the bag.  Leave the ice on for 20 minutes, 2–3 times a day.  If your skin turns bright red, remove the ice right away to prevent skin damage. The risk of damage is higher if you cannot feel pain, heat, or cold.  Move your toes often to reduce stiffness and swelling.  Raise (elevate) your foot above the level of your heart while you are sitting or lying down.  Activity    Do not do activities that cause pain.  Ask your provider when it is safe to drive if you have an air cast on your foot.  If you go to physical therapy, do exercises as told by your provider or therapist.  Return to your normal activities as told by your provider. Ask your provider what activities are safe for you.  General instructions    Take over-the-counter and prescription medicines only as told by your provider.  If told, wrap your foot with an elastic bandage or other wrap. This can help to keep your tendon from moving too much while it heals. Your provider will show you how to wrap your foot.  Wear supportive shoes or heel lifts only as told by your provider.  Contact a health care provider if:  Your symptoms get worse.  Your pain does not get better with medicine.  You have new symptoms that you cannot explain.  You have warmth and swelling in your foot.  You have a fever.  Get help right away if:  You hear a sudden popping sound in your Achilles tendon and then have severe pain.  You cannot move your toes or foot.  You cannot put any weight on your foot.  Your foot or toes become numb and look white or blue even after you loosen your bandage or air cast.

## 2025-01-05 NOTE — ED PROCEDURE NOTE - CPROC ED COMPLICATIONS1
no
[FreeTextEntry1] : Impacted cerumen, grey curette moderate  amount . Procedure well tolerated. \par Supportive care\par Symptomatic treatment\par Handwashing and infection control discussed.\par Next visit recheck if still febrile or symptomatic in  3 days, earlier if worsening symptoms.\par Moisturize skin discussed re Vanicream, re coverings re arms re itching at night, hydrocortisone 2.5% ointment sparingly to other region\par Medication given mometasone to extremities\par REFEr allergist\par \par \par

## 2025-01-10 ENCOUNTER — APPOINTMENT (OUTPATIENT)
Dept: ORTHOPEDIC SURGERY | Facility: CLINIC | Age: 50
End: 2025-01-10
Payer: MEDICAID

## 2025-01-10 ENCOUNTER — NON-APPOINTMENT (OUTPATIENT)
Age: 50
End: 2025-01-10

## 2025-01-10 DIAGNOSIS — M76.61 ACHILLES TENDINITIS, RIGHT LEG: ICD-10-CM

## 2025-01-10 PROCEDURE — 99203 OFFICE O/P NEW LOW 30 MIN: CPT

## 2025-02-07 ENCOUNTER — APPOINTMENT (OUTPATIENT)
Dept: ORTHOPEDIC SURGERY | Facility: CLINIC | Age: 50
End: 2025-02-07
Payer: MEDICAID

## 2025-02-07 ENCOUNTER — NON-APPOINTMENT (OUTPATIENT)
Age: 50
End: 2025-02-07

## 2025-02-07 DIAGNOSIS — M76.61 ACHILLES TENDINITIS, RIGHT LEG: ICD-10-CM

## 2025-02-07 PROCEDURE — 99204 OFFICE O/P NEW MOD 45 MIN: CPT

## 2025-02-11 ENCOUNTER — NON-APPOINTMENT (OUTPATIENT)
Age: 50
End: 2025-02-11

## 2025-02-24 ENCOUNTER — EMERGENCY (EMERGENCY)
Facility: HOSPITAL | Age: 50
LOS: 0 days | Discharge: HOME | End: 2025-02-24
Attending: EMERGENCY MEDICINE
Payer: MEDICAID

## 2025-02-24 VITALS
WEIGHT: 119.93 LBS | TEMPERATURE: 98 F | DIASTOLIC BLOOD PRESSURE: 85 MMHG | HEIGHT: 65 IN | SYSTOLIC BLOOD PRESSURE: 126 MMHG | RESPIRATION RATE: 17 BRPM | OXYGEN SATURATION: 99 % | HEART RATE: 97 BPM

## 2025-02-24 DIAGNOSIS — J02.9 ACUTE PHARYNGITIS, UNSPECIFIED: ICD-10-CM

## 2025-02-24 DIAGNOSIS — R05.1 ACUTE COUGH: ICD-10-CM

## 2025-02-24 DIAGNOSIS — R50.9 FEVER, UNSPECIFIED: ICD-10-CM

## 2025-02-24 DIAGNOSIS — R09.89 OTHER SPECIFIED SYMPTOMS AND SIGNS INVOLVING THE CIRCULATORY AND RESPIRATORY SYSTEMS: ICD-10-CM

## 2025-02-24 DIAGNOSIS — F17.200 NICOTINE DEPENDENCE, UNSPECIFIED, UNCOMPLICATED: ICD-10-CM

## 2025-02-24 LAB
FLUAV AG NPH QL: SIGNIFICANT CHANGE UP
FLUBV AG NPH QL: SIGNIFICANT CHANGE UP
RSV RNA NPH QL NAA+NON-PROBE: SIGNIFICANT CHANGE UP
SARS-COV-2 RNA SPEC QL NAA+PROBE: SIGNIFICANT CHANGE UP
SPECIMEN SOURCE: SIGNIFICANT CHANGE UP

## 2025-02-24 PROCEDURE — 87591 N.GONORRHOEAE DNA AMP PROB: CPT

## 2025-02-24 PROCEDURE — 87491 CHLMYD TRACH DNA AMP PROBE: CPT

## 2025-02-24 PROCEDURE — 0241U: CPT

## 2025-02-24 PROCEDURE — 99283 EMERGENCY DEPT VISIT LOW MDM: CPT

## 2025-02-24 PROCEDURE — 99284 EMERGENCY DEPT VISIT MOD MDM: CPT

## 2025-02-24 RX ORDER — ACETAMINOPHEN 160 MG/5ML
975 SUSPENSION ORAL ONCE
Refills: 0 | Status: COMPLETED | OUTPATIENT
Start: 2025-02-24 | End: 2025-02-24

## 2025-02-24 RX ORDER — AMOXICILLIN AND CLAVULANATE POTASSIUM 200; 28.5 MG/5ML; MG/5ML
1 POWDER, FOR SUSPENSION ORAL
Qty: 20 | Refills: 0
Start: 2025-02-24 | End: 2025-03-05

## 2025-02-24 RX ORDER — DEXAMETHASONE SODIUM PHOSPHATE 4 MG/ML
10 INJECTION, SOLUTION INTRA-ARTICULAR; INTRALESIONAL; INTRAMUSCULAR; INTRAVENOUS; SOFT TISSUE ONCE
Refills: 0 | Status: COMPLETED | OUTPATIENT
Start: 2025-02-24 | End: 2025-02-24

## 2025-02-24 RX ADMIN — DEXAMETHASONE SODIUM PHOSPHATE 10 MILLIGRAM(S): 4 INJECTION, SOLUTION INTRA-ARTICULAR; INTRALESIONAL; INTRAMUSCULAR; INTRAVENOUS; SOFT TISSUE at 12:27

## 2025-02-24 RX ADMIN — ACETAMINOPHEN 975 MILLIGRAM(S): 160 SUSPENSION ORAL at 12:27

## 2025-02-24 NOTE — ED PROVIDER NOTE - OBJECTIVE STATEMENT
49-year-old female with no past medical history presents to the ED complaining of sore throat, body aches, fever, chills, cough and runny nose for 4 days.  Patient also requesting pregnancy test and STD testing.  Patient denies any abdominal pain, vaginal bleeding, vaginal discharge, dysuria.

## 2025-02-24 NOTE — ED PROVIDER NOTE - CLINICAL SUMMARY MEDICAL DECISION MAKING FREE TEXT BOX
Agree with above history and exam.  Patient here with sore throat body aches fever chills mild posterior oropharynx erythema.  Will treat with Decadron/abx advised outpatient follow-up.  Patient also requesting STD testing although has no GYN related complaints/sx' s.

## 2025-02-24 NOTE — ED PROVIDER NOTE - PATIENT PORTAL LINK FT
You can access the FollowMyHealth Patient Portal offered by [facility] by registering at the following website: [email or web address]. By joining [employer]’s FollowMyHealth portal, you will also be able to view your health information using other applications (apps) compatible with our system.

## 2025-02-24 NOTE — ED ADULT NURSE NOTE - NSFALLUNIVINTERV_ED_ALL_ED
Bed/Stretcher in lowest position, wheels locked, appropriate side rails in place/Call bell, personal items and telephone in reach/Instruct patient to call for assistance before getting out of bed/chair/stretcher/Non-slip footwear applied when patient is off stretcher/[place] to call system/Physically safe environment - no spills, clutter or unnecessary equipment/Purposeful proactive rounding/Room/bathroom lighting operational, light cord in reach

## 2025-02-24 NOTE — ED ADULT TRIAGE NOTE - SOURCE OF INFORMATION
Recorded Pressure: LV, Ao, HR=71, Condition=Condition 1 (Left Ventricle) /2/9, (Aorta) Ao 118/84/98 Patient

## 2025-02-25 LAB
C TRACH RRNA SPEC QL NAA+PROBE: SIGNIFICANT CHANGE UP
N GONORRHOEA RRNA SPEC QL NAA+PROBE: SIGNIFICANT CHANGE UP